# Patient Record
Sex: MALE | Race: WHITE | HISPANIC OR LATINO | Employment: OTHER | URBAN - METROPOLITAN AREA
[De-identification: names, ages, dates, MRNs, and addresses within clinical notes are randomized per-mention and may not be internally consistent; named-entity substitution may affect disease eponyms.]

---

## 2017-12-05 ENCOUNTER — OFFICE VISIT (OUTPATIENT)
Dept: MEDICAL GROUP | Facility: PHYSICIAN GROUP | Age: 67
End: 2017-12-05
Payer: MEDICARE

## 2017-12-05 VITALS
OXYGEN SATURATION: 95 % | WEIGHT: 157 LBS | BODY MASS INDEX: 26.8 KG/M2 | SYSTOLIC BLOOD PRESSURE: 126 MMHG | HEART RATE: 87 BPM | RESPIRATION RATE: 14 BRPM | HEIGHT: 64 IN | TEMPERATURE: 98.4 F | DIASTOLIC BLOOD PRESSURE: 72 MMHG

## 2017-12-05 DIAGNOSIS — G47.33 OSA (OBSTRUCTIVE SLEEP APNEA): ICD-10-CM

## 2017-12-05 DIAGNOSIS — N40.1 BENIGN PROSTATIC HYPERPLASIA WITH WEAK URINARY STREAM: ICD-10-CM

## 2017-12-05 DIAGNOSIS — Z94.1 HEART TRANSPLANTED (HCC): ICD-10-CM

## 2017-12-05 DIAGNOSIS — Z00.00 ROUTINE GENERAL MEDICAL EXAMINATION AT A HEALTH CARE FACILITY: ICD-10-CM

## 2017-12-05 DIAGNOSIS — R39.12 BENIGN PROSTATIC HYPERPLASIA WITH WEAK URINARY STREAM: ICD-10-CM

## 2017-12-05 DIAGNOSIS — F51.01 PRIMARY INSOMNIA: ICD-10-CM

## 2017-12-05 DIAGNOSIS — G44.211 INTRACTABLE EPISODIC TENSION-TYPE HEADACHE: ICD-10-CM

## 2017-12-05 DIAGNOSIS — E78.2 MIXED HYPERLIPIDEMIA: ICD-10-CM

## 2017-12-05 DIAGNOSIS — E55.9 VITAMIN D DEFICIENCY: ICD-10-CM

## 2017-12-05 PROBLEM — N40.0 BPH (BENIGN PROSTATIC HYPERPLASIA): Status: ACTIVE | Noted: 2017-12-05

## 2017-12-05 PROBLEM — G44.209 TENSION TYPE HEADACHE: Status: ACTIVE | Noted: 2017-12-05

## 2017-12-05 PROCEDURE — 99204 OFFICE O/P NEW MOD 45 MIN: CPT | Performed by: INTERNAL MEDICINE

## 2017-12-05 RX ORDER — CALCIUM CARBONATE 500(1250)
500 TABLET ORAL 2 TIMES DAILY WITH MEALS
COMMUNITY

## 2017-12-05 RX ORDER — OMEPRAZOLE 40 MG/1
40 CAPSULE, DELAYED RELEASE ORAL DAILY
COMMUNITY
End: 2017-12-05

## 2017-12-05 RX ORDER — ASCORBIC ACID 500 MG
500 TABLET ORAL DAILY
COMMUNITY

## 2017-12-05 RX ORDER — TACROLIMUS 1 MG/1
3 CAPSULE ORAL 2 TIMES DAILY
COMMUNITY

## 2017-12-05 RX ORDER — MAGNESIUM 30 MG
1000 TABLET ORAL 2 TIMES DAILY
COMMUNITY

## 2017-12-05 RX ORDER — MYCOPHENOLIC ACID 180 MG/1
180 TABLET, DELAYED RELEASE ORAL 2 TIMES DAILY
COMMUNITY

## 2017-12-05 RX ORDER — TAMSULOSIN HYDROCHLORIDE 0.4 MG/1
0.4 CAPSULE ORAL
COMMUNITY
End: 2017-12-05

## 2017-12-05 RX ORDER — ERGOCALCIFEROL 1.25 MG/1
CAPSULE ORAL
COMMUNITY

## 2017-12-05 RX ORDER — VITAMIN E 268 MG
400 CAPSULE ORAL DAILY
COMMUNITY

## 2017-12-05 RX ORDER — ASPIRIN 81 MG/1
81 TABLET, CHEWABLE ORAL DAILY
COMMUNITY

## 2017-12-05 ASSESSMENT — PATIENT HEALTH QUESTIONNAIRE - PHQ9: CLINICAL INTERPRETATION OF PHQ2 SCORE: 0

## 2017-12-05 NOTE — ASSESSMENT & PLAN NOTE
He has trouble falling a sleep, staying in sleep. Contributing due to medications and previous habits of traveling a lot. He had used melatonin in the past, but it did not help significantly. His partners reports that he is doing well with sleep hygiene, as sleeping same time, waking up same time, dark room, keeping electronic away. Max he sleeps is 6 hours a day. He feels better if he sleeps longer

## 2017-12-05 NOTE — ASSESSMENT & PLAN NOTE
He has sleep apnea. He reports that he is on BiPAP machine. He is asking for referral for the ordering BiPAP machine and equipment of the machine.

## 2017-12-05 NOTE — ASSESSMENT & PLAN NOTE
It had heart transplant in 10/2010, after ending up in coma from a major heart attack. He is on mycophenolate and tacrolimus. Follows with cardiac surgeon.  He does angiogram once a year. [place]. He sometimes gets chest pain and sob. He is tolerating current regimen well.

## 2017-12-05 NOTE — ASSESSMENT & PLAN NOTE
Headache is usually after he wakes up, if he hasn't a good night sleep. He describe as pressure like, relives with tylenol, last 20 mins. It is not very frequent. Sometimes bothers by light or noise

## 2017-12-05 NOTE — LETTER
[facility]  Benny Pathak M.D.  [address]  [place] [address]  Fax: [phone]   Authorization for Release/Disclosure of   Protected Health Information   Name: FRANKY SANTIAGO : 1950 SSN: [id number]   Address: [address] [place] [address] [place] [address] Phone:    [phone] (home)    I authorize the entity listed below to release/disclose the PHI below to:   [facility]/Benny Pathak M.D. and Benny Pathak M.D.   Provider or Entity Name:  Dr. Allison    Address   [place], [place]  Phone:      Fax:     Reason for request: continuity of care   Information to be released:    [x  ] LAST COLONOSCOPY,  including any PATH REPORT and follow-up  [  ] LAST FIT/COLOGUARD RESULT [  ] LAST DEXA  [  ] LAST MAMMOGRAM  [  ] LAST PAP  [  ] LAST LABS [  ] RETINA EXAM REPORT  [  ] IMMUNIZATION RECORDS  [x  ] Release all info  {X }Angiogram      [  ] Check here and initial the line next to each item to release ALL health information INCLUDING  _____ Care and treatment for drug and / or alcohol abuse  _____ HIV testing, infection status, or AIDS  _____ Genetic Testing    DATES OF SERVICE OR TIME PERIOD TO BE DISCLOSED: _____________  I understand and acknowledge that:  * This Authorization may be revoked at any time by you in writing, except if your health information has already been used or disclosed.  * Your health information that will be used or disclosed as a result of you signing this authorization could be re-disclosed by the recipient. If this occurs, your re-disclosed health information may no longer be protected by State or Federal laws.  * You may refuse to sign this Authorization. Your refusal will not affect your ability to obtain treatment.  * This Authorization becomes effective upon signing and will  on (date) __________.      If no date is indicated, this Authorization will  one (1) year from the signature date.    Name: Franky Santiago    Signature:   Date:     2017       PLEASE FAX  REQUESTED RECORDS BACK TO: [phone]

## 2017-12-05 NOTE — PROGRESS NOTES
New Patient to Establish    Reason to establish: heart transplant, lab work    Franky Castorena is a 67 y.o. male here today for evaluation and management of:    Heart transplanted (CMS-[facility])  It had heart transplant in 10/2010, after ending up in coma from a major heart attack. He is on mycophenolate and tacrolimus. Follows with cardiac surgeon.  He does angiogram once a year. [place]. He sometimes gets chest pain and sob. He is tolerating current regimen well.     Vitamin D deficiency  He is on ergocalciferol 18544 unit weekly. He has been taking it for 7 years.     Tension type headache  Headache is usually after he wakes up, if he hasn't a good night sleep. He describe as pressure like, relives with tylenol, last 20 mins. It is not very frequent. Sometimes bothers by light or noise    Primary insomnia  He has trouble falling a sleep, staying in sleep. Contributing due to medications and previous habits of traveling a lot. He had used melatonin in the past, but it did not help significantly. His partners reports that he is doing well with sleep hygiene, as sleeping same time, waking up same time, dark room, keeping electronic away. Max he sleeps is 6 hours a day. He feels better if he sleeps longer     CORKY (obstructive sleep apnea)  He has sleep apnea. He reports that he is on BiPAP machine. He is asking for referral for the ordering BiPAP machine and equipment of the machine.     Mixed hyperlipidemia  He cannot tolerate statin, due to muscle aches. He uses fish oil. He tries to eat healthy. Used to be active. Not very active recently     BPH (benign prostatic hyperplasia)  He main weakness is weak stream, wakes up 1-2 times per night. He is tamsulosin, because he was having side effects, headache from it. He denies hematuria, unintentional weight loss, dysuria      Past Medical History:   Diagnosis Date   • GERD (gastroesophageal reflux disease)    • Heart attack    • Hyperlipidemia    • CORKY (obstructive sleep  "apnea)        Current Outpatient Prescriptions   Medication Sig Dispense Refill   • tacrolimus (PROGRAF) 1 MG Cap Take 3 mg by mouth 2 Times a Day. Take 2 capsules in the morning by mouth and take 1 capsule every evening by mouth      • mycophenolate (MYFORTIC) 180 MG EC tablet Take 180 mg by mouth 2 Times a Day.     • vitamin D, Ergocalciferol, (DRISDOL) 65492 units Cap capsule Take  by mouth every 7 days.     • calcium carbonate (CALCIUM CARBONATE) 500 MG Tab Take 500 mg by mouth 2 times a day, with meals.     • magnesium gluconate 556 mg (ELEMENTAL MAG = 30 MG) 30 MG Tab Take 1,000 mg by mouth 2 times a day.     • vitamin e (VITAMIN E) 400 UNIT Cap Take 400 Units by mouth every day.     • aspirin (ASA) 81 MG Chew Tab chewable tablet Take 81 mg by mouth every day.     • ascorbic acid (ASCORBIC ACID) 500 MG Tab Take 500 mg by mouth every day.       No current facility-administered medications for this visit.        Allergies as of 12/05/2017   • (No Known Allergies)       Social History     Social History   • Marital status:      Spouse name: N/A   • Number of children: N/A   • Years of education: N/A     Occupational History   • Not on file.     Social History Main Topics   • Smoking status: Never Smoker   • Smokeless tobacco: Never Used   • Alcohol use No   • Drug use: No   • Sexual activity: Yes     Partners: Female      Comment: 3 children     Other Topics Concern   • Not on file     Social History Narrative   • No narrative on file       Family History   Problem Relation Age of Onset   • Hypertension Father    • Diabetes Maternal Grandfather    • Heart Disease Neg Hx    • Cancer Neg Hx        Past Surgical History:   Procedure Laterality Date   • CARDIAC CATH, RIGHT/LEFT HEART     • RHINOPLASTY     • THORACOTOMY     • TONSILLECTOMY         ROS: All systems reviewed are negative except for HPI      /72   Pulse 87   Temp 36.9 °C (98.4 °F)   Resp 14   Ht 1.626 m (5' 4\")   Wt 71.2 kg (157 lb)   " SpO2 95%   BMI 26.95 kg/m²     Physical Exam  General:  Alert and oriented, No apparent distress.  Eyes: Pupils equal and reactive. No scleral icterus. EOMI  Throat: Clear no erythema or exudates noted. Oral mucosa moist, oral dental intact  Neck: Supple. No cervical or supraclavicular lymphadenopathy noted. Thyroid not enlarged.  Lungs: normal effort,  Clear to auscultation  Cardiovascular: Regular rate and rhythm. No murmurs, rubs or gallops, pulses intact   Abdomen:  Soft, +BS, no tenderness. No rebound or guarding noted. No hepato or splenomegaly   Extremities: No clubbing, cyanosis, edema.  Neuro: cranial nerves intact, sensation intact   Muscle skeletal: rom intact   Skin: Clear. No rash or suspicious skin lesions noted.    Assessment and Plan    1. CORKY (obstructive sleep apnea)  Follow up with [facility] for machine adjustment   - CBC WITH DIFFERENTIAL; Future  - COMP METABOLIC PANEL; Future  - REFERRAL TO SLEEP STUDIES  - REFERRAL TO PULMONOLOGY    2. Heart transplanted (CMS-HCC)  Follow up with transplant center, per their protocol   - CBC WITH DIFFERENTIAL; Future  - COMP METABOLIC PANEL; Future    3. Routine general medical examination at a health care facility  - CBC WITH DIFFERENTIAL; Future  - COMP METABOLIC PANEL; Future    4. Benign prostatic hyperplasia with weak urinary stream  He does not want to get prescription, symptoms not bad enough to get treat. He likes to continue with herbal   - CBC WITH DIFFERENTIAL; Future  - COMP METABOLIC PANEL; Future    5. Vitamin D deficiency  Continue supplement, continue to monitor   - CBC WITH DIFFERENTIAL; Future  - COMP METABOLIC PANEL; Future  - VITAMIN D,25 HYDROXY; Future    6. Primary insomnia  He is working with schedule. He states that he does not need medications.   - CBC WITH DIFFERENTIAL; Future  - COMP METABOLIC PANEL; Future    7. Mixed hyperlipidemia  Continue to monitor. No a candidate for statin, he has side effects   - CBC WITH DIFFERENTIAL; Future  -  COMP METABOLIC PANEL; Future  - LIPID PROFILE; Future    8. Intractable episodic tension-type headache  Not bothering him significantly. He takes tylenol, rare. Rest resolves most of the time the pain   - CBC WITH DIFFERENTIAL; Future  - COMP METABOLIC PANEL; Future      Followup: Return in about 1 year (around 12/5/2018), or if symptoms worsen or fail to improve, for Short, annual exam.    Signed by: Benny Pathak M.D.

## 2017-12-05 NOTE — ASSESSMENT & PLAN NOTE
He main weakness is weak stream, wakes up 1-2 times per night. He is tamsulosin, because he was having side effects, headache from it. He denies hematuria, unintentional weight loss, dysuria

## 2017-12-05 NOTE — ASSESSMENT & PLAN NOTE
He cannot tolerate statin, due to muscle aches. He uses fish oil. He tries to eat healthy. Used to be active. Not very active recently

## 2017-12-08 ENCOUNTER — HOSPITAL ENCOUNTER (OUTPATIENT)
Dept: LAB | Facility: MEDICAL CENTER | Age: 67
End: 2017-12-08
Attending: INTERNAL MEDICINE
Payer: MEDICARE

## 2017-12-08 DIAGNOSIS — G44.211 INTRACTABLE EPISODIC TENSION-TYPE HEADACHE: ICD-10-CM

## 2017-12-08 DIAGNOSIS — F51.01 PRIMARY INSOMNIA: ICD-10-CM

## 2017-12-08 DIAGNOSIS — Z94.1 HEART TRANSPLANTED (HCC): ICD-10-CM

## 2017-12-08 DIAGNOSIS — N40.1 BENIGN PROSTATIC HYPERPLASIA WITH WEAK URINARY STREAM: ICD-10-CM

## 2017-12-08 DIAGNOSIS — E78.2 MIXED HYPERLIPIDEMIA: ICD-10-CM

## 2017-12-08 DIAGNOSIS — R39.12 BENIGN PROSTATIC HYPERPLASIA WITH WEAK URINARY STREAM: ICD-10-CM

## 2017-12-08 DIAGNOSIS — Z00.00 ROUTINE GENERAL MEDICAL EXAMINATION AT A HEALTH CARE FACILITY: ICD-10-CM

## 2017-12-08 DIAGNOSIS — G47.33 OSA (OBSTRUCTIVE SLEEP APNEA): ICD-10-CM

## 2017-12-08 DIAGNOSIS — E55.9 VITAMIN D DEFICIENCY: ICD-10-CM

## 2017-12-08 LAB
25(OH)D3 SERPL-MCNC: 56 NG/ML (ref 30–100)
ALBUMIN SERPL BCP-MCNC: 4.5 G/DL (ref 3.2–4.9)
ALBUMIN/GLOB SERPL: 1.6 G/DL
ALP SERPL-CCNC: 61 U/L (ref 30–99)
ALT SERPL-CCNC: 18 U/L (ref 2–50)
ANION GAP SERPL CALC-SCNC: 7 MMOL/L (ref 0–11.9)
AST SERPL-CCNC: 19 U/L (ref 12–45)
BASOPHILS # BLD AUTO: 0.5 % (ref 0–1.8)
BASOPHILS # BLD: 0.02 K/UL (ref 0–0.12)
BILIRUB SERPL-MCNC: 0.8 MG/DL (ref 0.1–1.5)
BUN SERPL-MCNC: 16 MG/DL (ref 8–22)
CALCIUM SERPL-MCNC: 9.8 MG/DL (ref 8.5–10.5)
CHLORIDE SERPL-SCNC: 108 MMOL/L (ref 96–112)
CHOLEST SERPL-MCNC: 169 MG/DL (ref 100–199)
CO2 SERPL-SCNC: 27 MMOL/L (ref 20–33)
CREAT SERPL-MCNC: 0.77 MG/DL (ref 0.5–1.4)
EOSINOPHIL # BLD AUTO: 0.05 K/UL (ref 0–0.51)
EOSINOPHIL NFR BLD: 1.2 % (ref 0–6.9)
ERYTHROCYTE [DISTWIDTH] IN BLOOD BY AUTOMATED COUNT: 46.2 FL (ref 35.9–50)
GFR SERPL CREATININE-BSD FRML MDRD: >60 ML/MIN/1.73 M 2
GLOBULIN SER CALC-MCNC: 2.8 G/DL (ref 1.9–3.5)
GLUCOSE SERPL-MCNC: 128 MG/DL (ref 65–99)
HCT VFR BLD AUTO: 49.5 % (ref 42–52)
HDLC SERPL-MCNC: 31 MG/DL
HGB BLD-MCNC: 16.6 G/DL (ref 14–18)
IMM GRANULOCYTES # BLD AUTO: 0.01 K/UL (ref 0–0.11)
IMM GRANULOCYTES NFR BLD AUTO: 0.2 % (ref 0–0.9)
LDLC SERPL CALC-MCNC: 89 MG/DL
LYMPHOCYTES # BLD AUTO: 1.27 K/UL (ref 1–4.8)
LYMPHOCYTES NFR BLD: 31.1 % (ref 22–41)
MCH RBC QN AUTO: 32 PG (ref 27–33)
MCHC RBC AUTO-ENTMCNC: 33.5 G/DL (ref 33.7–35.3)
MCV RBC AUTO: 95.4 FL (ref 81.4–97.8)
MONOCYTES # BLD AUTO: 0.31 K/UL (ref 0–0.85)
MONOCYTES NFR BLD AUTO: 7.6 % (ref 0–13.4)
NEUTROPHILS # BLD AUTO: 2.43 K/UL (ref 1.82–7.42)
NEUTROPHILS NFR BLD: 59.4 % (ref 44–72)
NRBC # BLD AUTO: 0 K/UL
NRBC BLD AUTO-RTO: 0 /100 WBC
PLATELET # BLD AUTO: 146 K/UL (ref 164–446)
PMV BLD AUTO: 11.4 FL (ref 9–12.9)
POTASSIUM SERPL-SCNC: 3.9 MMOL/L (ref 3.6–5.5)
PROT SERPL-MCNC: 7.3 G/DL (ref 6–8.2)
RBC # BLD AUTO: 5.19 M/UL (ref 4.7–6.1)
SODIUM SERPL-SCNC: 142 MMOL/L (ref 135–145)
TRIGL SERPL-MCNC: 244 MG/DL (ref 0–149)
WBC # BLD AUTO: 4.1 K/UL (ref 4.8–10.8)

## 2017-12-08 PROCEDURE — 82306 VITAMIN D 25 HYDROXY: CPT

## 2017-12-08 PROCEDURE — 85025 COMPLETE CBC W/AUTO DIFF WBC: CPT

## 2017-12-08 PROCEDURE — 36415 COLL VENOUS BLD VENIPUNCTURE: CPT

## 2017-12-08 PROCEDURE — 80061 LIPID PANEL: CPT

## 2017-12-08 PROCEDURE — 80053 COMPREHEN METABOLIC PANEL: CPT

## 2017-12-12 ENCOUNTER — TELEPHONE (OUTPATIENT)
Dept: MEDICAL GROUP | Facility: PHYSICIAN GROUP | Age: 67
End: 2017-12-12

## 2017-12-12 NOTE — TELEPHONE ENCOUNTER
----- Message from Benny Pathak M.D. sent at 12/12/2017  8:08 AM PST -----  Please advised patient I reviewed lab results. There were some results need further evaluate. Fasting sugar is slight elevated, white and platelets are slight low.  Please schedule an office visit.  Thank you,  Benny Pathak M.D.

## 2017-12-12 NOTE — TELEPHONE ENCOUNTER
Phone Number Called: [phone] (home)     Message: Unable to reach pt left vm to call back regarding labs.    Left Message for patient to call back: N\A

## 2017-12-13 NOTE — TELEPHONE ENCOUNTER
Spoke with patient and let them know Benny Pathak M.D.'s message.  Appointment scheduled for 12/22/17 @ 7 AM - patient is currently out of town and this is the soonest he would be available.

## 2017-12-15 NOTE — TELEPHONE ENCOUNTER
Phone Number Called: [phone] (home)     Message: Unable to reach pt left vm to call back regarding labs sent [employer] message to notify pt.     Left Message for patient to call back: yes

## 2017-12-21 ENCOUNTER — TELEPHONE (OUTPATIENT)
Dept: MEDICAL GROUP | Facility: PHYSICIAN GROUP | Age: 67
End: 2017-12-21

## 2017-12-21 NOTE — TELEPHONE ENCOUNTER
ESTABLISHED PATIENT PRE-VISIT PLANNING     Note: Patient will not be contacted if there is no indication to call.     1.  Reviewed notes from the last few office visits within the medical group: Yes    2.  If any orders were placed at last visit or intended to be done for this visit (i.e. 6 mos follow-up), do we have Results/Consult Notes?        •  Labs - Labs ordered, completed on 12/08/17 and results are in chart.   Note: If patient appointment is for lab review and patient did not complete labs, check with provider if OK to reschedule patient until labs completed.       •  Imaging - Imaging was not ordered at last office visit.       •  Referrals - Referral ordered, patient has NOT been seen.    3. Is this appointment scheduled as a Hospital Follow-Up? No    4.  Immunizations were updated in Epic using WebIZ?: No WebIZ record       •  Web Iz Recommendations: UNKNOWN    5.  Patient is due for the following Health Maintenance Topics:   Health Maintenance Due   Topic Date Due   • Annual Wellness Visit  1950   • IMM DTaP/Tdap/Td Vaccine (1 - Tdap) 08/27/1971   • COLONOSCOPY  08/27/2002   • IMM ZOSTER VACCINE  08/27/2012   • IMM PNEUMOCOCCAL 65+ (ADULT) HIGH/HIGHEST RISK SERIES (1 of 2 - PCV13) 08/27/2017   • IMM INFLUENZA (1) 09/01/2017       - Patient already has appointment scheduled for Immunizations: FLU, PREVNAR (PCV13) , TDAP and ZOSTAVAX (Shingles).Please ask pt about immunizations at appointment      6.  Patient was NOT informed to arrive 15 min prior to their scheduled appointment and bring in their medication bottles.

## 2017-12-22 ENCOUNTER — OFFICE VISIT (OUTPATIENT)
Dept: MEDICAL GROUP | Facility: PHYSICIAN GROUP | Age: 67
End: 2017-12-22
Payer: MEDICARE

## 2017-12-22 ENCOUNTER — HOSPITAL ENCOUNTER (OUTPATIENT)
Dept: LAB | Facility: MEDICAL CENTER | Age: 67
End: 2017-12-22
Attending: INTERNAL MEDICINE
Payer: MEDICARE

## 2017-12-22 VITALS
TEMPERATURE: 97.3 F | OXYGEN SATURATION: 96 % | SYSTOLIC BLOOD PRESSURE: 134 MMHG | RESPIRATION RATE: 14 BRPM | DIASTOLIC BLOOD PRESSURE: 68 MMHG | HEART RATE: 88 BPM | BODY MASS INDEX: 26.46 KG/M2 | WEIGHT: 155 LBS | HEIGHT: 64 IN

## 2017-12-22 DIAGNOSIS — D69.6 THROMBOCYTOPENIA (HCC): ICD-10-CM

## 2017-12-22 DIAGNOSIS — R73.01 ABNORMAL FASTING GLUCOSE: ICD-10-CM

## 2017-12-22 DIAGNOSIS — D72.819 LEUKOPENIA, UNSPECIFIED TYPE: ICD-10-CM

## 2017-12-22 DIAGNOSIS — E78.2 MIXED HYPERLIPIDEMIA: ICD-10-CM

## 2017-12-22 LAB
FOLATE SERPL-MCNC: 9.5 NG/ML
VIT B12 SERPL-MCNC: 206 PG/ML (ref 211–911)

## 2017-12-22 PROCEDURE — 82607 VITAMIN B-12: CPT

## 2017-12-22 PROCEDURE — 36415 COLL VENOUS BLD VENIPUNCTURE: CPT

## 2017-12-22 PROCEDURE — 99214 OFFICE O/P EST MOD 30 MIN: CPT | Performed by: INTERNAL MEDICINE

## 2017-12-22 PROCEDURE — 82746 ASSAY OF FOLIC ACID SERUM: CPT

## 2017-12-22 NOTE — PROGRESS NOTES
Subjective:   Franky Castorena is a 67 y.o. male here today for lab work review, glucose elevated     67-year-old male with past medical history of heart transplant, benign prostatic hyperplasia, CORKY, tension headaches, insomnia presented as a follow-up for his lab work. Lab work revealed that  white cells are lower and also platelets too. Patient denies night sweats, lymphadenopathy, unintentional weight loss. He is on mycophenolate, tacrolimus and asa which can be contributory to this lab results.   Also noted from lab work that fasting glucose is 128 almost a diabetic range. Patient reports that before he was diagnosed as diabetic. He was treated for sometime and was taken off medications later. He denies polyuria, polydipsia or nocturia. Because of the season he is eating more deserts then usual. He has coffee with sugar. He denies numbness or tingling.   In regards to cholesterol, LDL < 100, HDL 31. Counseling for a small portion, balanced diet, fruits and vegetables on daily basis, exercising 3-5 times per week.      Current medicines (including changes today)  Current Outpatient Prescriptions   Medication Sig Dispense Refill   • tacrolimus (PROGRAF) 1 MG Cap Take 3 mg by mouth 2 Times a Day. Take 2 capsules in the morning by mouth and take 1 capsule every evening by mouth      • mycophenolate (MYFORTIC) 180 MG EC tablet Take 180 mg by mouth 2 Times a Day.     • vitamin D, Ergocalciferol, (DRISDOL) 21528 units Cap capsule Take  by mouth every 7 days.     • calcium carbonate (CALCIUM CARBONATE) 500 MG Tab Take 500 mg by mouth 2 times a day, with meals.     • magnesium gluconate 556 mg (ELEMENTAL MAG = 30 MG) 30 MG Tab Take 1,000 mg by mouth 2 times a day.     • vitamin e (VITAMIN E) 400 UNIT Cap Take 400 Units by mouth every day.     • aspirin (ASA) 81 MG Chew Tab chewable tablet Take 81 mg by mouth every day.     • ascorbic acid (ASCORBIC ACID) 500 MG Tab Take 500 mg by mouth every day.       No current  facility-administered medications for this visit.      He  has a past medical history of GERD (gastroesophageal reflux disease); Heart attack; Hyperlipidemia; and CORKY (obstructive sleep apnea).    Current Outpatient Prescriptions   Medication Sig Dispense Refill   • tacrolimus (PROGRAF) 1 MG Cap Take 3 mg by mouth 2 Times a Day. Take 2 capsules in the morning by mouth and take 1 capsule every evening by mouth      • mycophenolate (MYFORTIC) 180 MG EC tablet Take 180 mg by mouth 2 Times a Day.     • vitamin D, Ergocalciferol, (DRISDOL) 95577 units Cap capsule Take  by mouth every 7 days.     • calcium carbonate (CALCIUM CARBONATE) 500 MG Tab Take 500 mg by mouth 2 times a day, with meals.     • magnesium gluconate 556 mg (ELEMENTAL MAG = 30 MG) 30 MG Tab Take 1,000 mg by mouth 2 times a day.     • vitamin e (VITAMIN E) 400 UNIT Cap Take 400 Units by mouth every day.     • aspirin (ASA) 81 MG Chew Tab chewable tablet Take 81 mg by mouth every day.     • ascorbic acid (ASCORBIC ACID) 500 MG Tab Take 500 mg by mouth every day.       No current facility-administered medications for this visit.        Allergies as of 12/22/2017   • (No Known Allergies)       Social History     Social History   • Marital status:      Spouse name: N/A   • Number of children: N/A   • Years of education: N/A     Occupational History   • Not on file.     Social History Main Topics   • Smoking status: Never Smoker   • Smokeless tobacco: Never Used   • Alcohol use No   • Drug use: No   • Sexual activity: Yes     Partners: Female      Comment: 3 children     Other Topics Concern   • Not on file     Social History Narrative   • No narrative on file        Family History   Problem Relation Age of Onset   • Hypertension Father    • Diabetes Maternal Grandfather    • Heart Disease Neg Hx    • Cancer Neg Hx        Past Surgical History:   Procedure Laterality Date   • CARDIAC CATH, RIGHT/LEFT HEART     • RHINOPLASTY     • THORACOTOMY     •  "TONSILLECTOMY         ROS   All systems reviewed are negative except for HPI       Objective:     Blood pressure 134/68, pulse 88, temperature 36.3 °C (97.3 °F), resp. rate 14, height 1.626 m (5' 4\"), weight 70.3 kg (155 lb), SpO2 96 %. Body mass index is 26.61 kg/m².   Physical Exam:  General:  Alert and oriented, No apparent distress.  Eyes: Pupils equal and reactive. No scleral icterus. EOMI  Throat: Clear no erythema or exudates noted. Oral mucosa moist, oral dental intact  Neck: Supple. No cervical or supraclavicular lymphadenopathy noted. Thyroid not enlarged.  Lungs: normal effort,  Clear to auscultation  Cardiovascular: Regular rate and rhythm. No murmurs, rubs or gallops, pulses intact   Abdomen:  Soft, +BS, no tenderness. No rebound or guarding noted. No hepato or splenomegaly   Extremities: No clubbing, cyanosis, edema.  Neuro: cranial nerves intact, sensation intact   Muscle skeletal: rom intact   Skin: Clear. No rash or suspicious skin lesions noted.        Assessment and Plan:   The following treatment plan was discussed    1. Leukopenia, unspecified type  2. Thrombocytopenia (CMS-HCC)  Continue to monitor, multiple factorial . Likely medication??  I will check vitamin b12, folic acid, thyroid conditions. Cannot rule out MDS?? But bone marrow biopsy will be last test if neccessary. I advise pt to update his cardiologist about the new findings on lab work   - TSH WITH REFLEX TO FT4; Future  - FOLATE; Future  - VITAMIN B12; Future  - IRON/TOTAL IRON BIND; Future  - FERRITIN; Future      3. Abnormal fasting glucose  Continue to monitor. No benefits of treatment. Low carb diet  - HEMOGLOBIN A1C; Future    4. Mixed hyperlipidemia  Counseling for a small portion, balanced diet, fruits and vegetables,  exercising 3-5 times per week.      Followup: Return in about 1 year (around 12/22/2018), or if symptoms worsen or fail to improve, for Short, annual exam.         "

## 2017-12-26 ENCOUNTER — TELEPHONE (OUTPATIENT)
Dept: MEDICAL GROUP | Facility: PHYSICIAN GROUP | Age: 67
End: 2017-12-26

## 2017-12-27 NOTE — TELEPHONE ENCOUNTER
----- Message from Benny Pathak M.D. sent at 12/26/2017  5:09 PM PST -----  Please let pt knows that vitamin B12 is low. He needs to get injections once per week at the clinic, and after that he can take oral supplement over the encounter 1000 mcg daily.   He also needs to take vitamin folic acid over the encounter  Please let me know if he has questions   Benny Pathak M.D.

## 2018-03-22 ENCOUNTER — APPOINTMENT (OUTPATIENT)
Dept: SLEEP MEDICINE | Facility: MEDICAL CENTER | Age: 68
End: 2018-03-22
Payer: MEDICARE

## 2020-12-18 ENCOUNTER — HOSPITAL ENCOUNTER (OUTPATIENT)
Dept: LAB | Facility: MEDICAL CENTER | Age: 70
End: 2020-12-18
Attending: NURSE PRACTITIONER
Payer: MEDICARE

## 2020-12-18 LAB
ALBUMIN SERPL BCP-MCNC: 4.3 G/DL (ref 3.2–4.9)
ALBUMIN/GLOB SERPL: 1.2 G/DL
ALP SERPL-CCNC: 74 U/L (ref 30–99)
ALT SERPL-CCNC: 9 U/L (ref 2–50)
ANION GAP SERPL CALC-SCNC: 5 MMOL/L (ref 7–16)
AST SERPL-CCNC: 17 U/L (ref 12–45)
BASOPHILS # BLD AUTO: 0.5 % (ref 0–1.8)
BASOPHILS # BLD: 0.03 K/UL (ref 0–0.12)
BILIRUB CONJ SERPL-MCNC: <0.2 MG/DL (ref 0.1–0.5)
BILIRUB INDIRECT SERPL-MCNC: NORMAL MG/DL (ref 0–1)
BILIRUB SERPL-MCNC: 0.5 MG/DL (ref 0.1–1.5)
BUN SERPL-MCNC: 12 MG/DL (ref 8–22)
CALCIUM SERPL-MCNC: 9.1 MG/DL (ref 8.5–10.5)
CHLORIDE SERPL-SCNC: 106 MMOL/L (ref 96–112)
CO2 SERPL-SCNC: 25 MMOL/L (ref 20–33)
CREAT SERPL-MCNC: 0.79 MG/DL (ref 0.5–1.4)
EOSINOPHIL # BLD AUTO: 0.06 K/UL (ref 0–0.51)
EOSINOPHIL NFR BLD: 1.1 % (ref 0–6.9)
ERYTHROCYTE [DISTWIDTH] IN BLOOD BY AUTOMATED COUNT: 45.7 FL (ref 35.9–50)
GLOBULIN SER CALC-MCNC: 3.5 G/DL (ref 1.9–3.5)
GLUCOSE SERPL-MCNC: 86 MG/DL (ref 65–99)
HCT VFR BLD AUTO: 47 % (ref 42–52)
HGB BLD-MCNC: 15.5 G/DL (ref 14–18)
IMM GRANULOCYTES # BLD AUTO: 0.02 K/UL (ref 0–0.11)
IMM GRANULOCYTES NFR BLD AUTO: 0.4 % (ref 0–0.9)
LYMPHOCYTES # BLD AUTO: 1.36 K/UL (ref 1–4.8)
LYMPHOCYTES NFR BLD: 24.4 % (ref 22–41)
MAGNESIUM SERPL-MCNC: 1.8 MG/DL (ref 1.5–2.5)
MCH RBC QN AUTO: 31.1 PG (ref 27–33)
MCHC RBC AUTO-ENTMCNC: 33 G/DL (ref 33.7–35.3)
MCV RBC AUTO: 94.4 FL (ref 81.4–97.8)
MONOCYTES # BLD AUTO: 0.45 K/UL (ref 0–0.85)
MONOCYTES NFR BLD AUTO: 8.1 % (ref 0–13.4)
NEUTROPHILS # BLD AUTO: 3.66 K/UL (ref 1.82–7.42)
NEUTROPHILS NFR BLD: 65.5 % (ref 44–72)
NRBC # BLD AUTO: 0 K/UL
NRBC BLD-RTO: 0 /100 WBC
PLATELET # BLD AUTO: 155 K/UL (ref 164–446)
PMV BLD AUTO: 11 FL (ref 9–12.9)
POTASSIUM SERPL-SCNC: 3.8 MMOL/L (ref 3.6–5.5)
PROT SERPL-MCNC: 7.8 G/DL (ref 6–8.2)
RBC # BLD AUTO: 4.98 M/UL (ref 4.7–6.1)
SODIUM SERPL-SCNC: 136 MMOL/L (ref 135–145)
WBC # BLD AUTO: 5.6 K/UL (ref 4.8–10.8)

## 2020-12-18 PROCEDURE — 85025 COMPLETE CBC W/AUTO DIFF WBC: CPT

## 2020-12-18 PROCEDURE — 82248 BILIRUBIN DIRECT: CPT

## 2020-12-18 PROCEDURE — 83735 ASSAY OF MAGNESIUM: CPT

## 2020-12-18 PROCEDURE — 80053 COMPREHEN METABOLIC PANEL: CPT

## 2020-12-18 PROCEDURE — 36415 COLL VENOUS BLD VENIPUNCTURE: CPT

## 2021-03-03 DIAGNOSIS — Z23 NEED FOR VACCINATION: ICD-10-CM

## 2021-04-07 ENCOUNTER — HOSPITAL ENCOUNTER (OUTPATIENT)
Dept: LAB | Facility: MEDICAL CENTER | Age: 71
End: 2021-04-07
Attending: INTERNAL MEDICINE
Payer: MEDICARE

## 2021-04-07 LAB
ALBUMIN SERPL BCP-MCNC: 4.3 G/DL (ref 3.2–4.9)
ALBUMIN/GLOB SERPL: 1.3 G/DL
ALP SERPL-CCNC: 67 U/L (ref 30–99)
ALT SERPL-CCNC: 11 U/L (ref 2–50)
ANION GAP SERPL CALC-SCNC: 10 MMOL/L (ref 7–16)
AST SERPL-CCNC: 13 U/L (ref 12–45)
BASOPHILS # BLD AUTO: 0.4 % (ref 0–1.8)
BASOPHILS # BLD: 0.02 K/UL (ref 0–0.12)
BILIRUB SERPL-MCNC: 0.7 MG/DL (ref 0.1–1.5)
BUN SERPL-MCNC: 15 MG/DL (ref 8–22)
CALCIUM SERPL-MCNC: 9.1 MG/DL (ref 8.5–10.5)
CHLORIDE SERPL-SCNC: 109 MMOL/L (ref 96–112)
CHOLEST SERPL-MCNC: 176 MG/DL (ref 100–199)
CO2 SERPL-SCNC: 24 MMOL/L (ref 20–33)
CREAT SERPL-MCNC: 0.87 MG/DL (ref 0.5–1.4)
EOSINOPHIL # BLD AUTO: 0.07 K/UL (ref 0–0.51)
EOSINOPHIL NFR BLD: 1.3 % (ref 0–6.9)
ERYTHROCYTE [DISTWIDTH] IN BLOOD BY AUTOMATED COUNT: 46.9 FL (ref 35.9–50)
GLOBULIN SER CALC-MCNC: 3.3 G/DL (ref 1.9–3.5)
GLUCOSE SERPL-MCNC: 90 MG/DL (ref 65–99)
HCT VFR BLD AUTO: 45.4 % (ref 42–52)
HDLC SERPL-MCNC: 29 MG/DL
HGB BLD-MCNC: 15.2 G/DL (ref 14–18)
IMM GRANULOCYTES # BLD AUTO: 0.02 K/UL (ref 0–0.11)
IMM GRANULOCYTES NFR BLD AUTO: 0.4 % (ref 0–0.9)
LDLC SERPL CALC-MCNC: 110 MG/DL
LYMPHOCYTES # BLD AUTO: 1.57 K/UL (ref 1–4.8)
LYMPHOCYTES NFR BLD: 29.3 % (ref 22–41)
MAGNESIUM SERPL-MCNC: 2 MG/DL (ref 1.5–2.5)
MCH RBC QN AUTO: 31.5 PG (ref 27–33)
MCHC RBC AUTO-ENTMCNC: 33.5 G/DL (ref 33.7–35.3)
MCV RBC AUTO: 94.2 FL (ref 81.4–97.8)
MONOCYTES # BLD AUTO: 0.46 K/UL (ref 0–0.85)
MONOCYTES NFR BLD AUTO: 8.6 % (ref 0–13.4)
NEUTROPHILS # BLD AUTO: 3.22 K/UL (ref 1.82–7.42)
NEUTROPHILS NFR BLD: 60 % (ref 44–72)
NRBC # BLD AUTO: 0 K/UL
NRBC BLD-RTO: 0 /100 WBC
PHOSPHATE SERPL-MCNC: 2.9 MG/DL (ref 2.5–4.5)
PLATELET # BLD AUTO: 138 K/UL (ref 164–446)
PMV BLD AUTO: 11.1 FL (ref 9–12.9)
POTASSIUM SERPL-SCNC: 4.4 MMOL/L (ref 3.6–5.5)
PROT SERPL-MCNC: 7.6 G/DL (ref 6–8.2)
RBC # BLD AUTO: 4.82 M/UL (ref 4.7–6.1)
SODIUM SERPL-SCNC: 143 MMOL/L (ref 135–145)
TRIGL SERPL-MCNC: 183 MG/DL (ref 0–149)
WBC # BLD AUTO: 5.4 K/UL (ref 4.8–10.8)

## 2021-04-07 PROCEDURE — 80061 LIPID PANEL: CPT

## 2021-04-07 PROCEDURE — 83735 ASSAY OF MAGNESIUM: CPT

## 2021-04-07 PROCEDURE — 84100 ASSAY OF PHOSPHORUS: CPT

## 2021-04-07 PROCEDURE — 80053 COMPREHEN METABOLIC PANEL: CPT

## 2021-04-07 PROCEDURE — 36415 COLL VENOUS BLD VENIPUNCTURE: CPT

## 2021-04-07 PROCEDURE — 80197 ASSAY OF TACROLIMUS: CPT

## 2021-04-07 PROCEDURE — 85025 COMPLETE CBC W/AUTO DIFF WBC: CPT

## 2021-04-09 LAB — TACROLIMUS BLD-MCNC: 5.6 NG/ML
